# Patient Record
Sex: MALE | Race: WHITE | NOT HISPANIC OR LATINO | Employment: FULL TIME | URBAN - METROPOLITAN AREA
[De-identification: names, ages, dates, MRNs, and addresses within clinical notes are randomized per-mention and may not be internally consistent; named-entity substitution may affect disease eponyms.]

---

## 2022-06-28 ENCOUNTER — TELEPHONE (OUTPATIENT)
Dept: OPHTHALMOLOGY | Facility: CLINIC | Age: 52
End: 2022-06-28

## 2022-06-28 ENCOUNTER — TRANSFERRED RECORDS (OUTPATIENT)
Dept: HEALTH INFORMATION MANAGEMENT | Facility: CLINIC | Age: 52
End: 2022-06-28

## 2022-06-28 ENCOUNTER — OFFICE VISIT (OUTPATIENT)
Dept: OPHTHALMOLOGY | Facility: CLINIC | Age: 52
End: 2022-06-28
Attending: OPHTHALMOLOGY
Payer: COMMERCIAL

## 2022-06-28 DIAGNOSIS — H50.22 HYPERTROPIA OF LEFT EYE: Primary | ICD-10-CM

## 2022-06-28 DIAGNOSIS — H49.10 CONGENITAL PALSY OF FOURTH CRANIAL NERVE: ICD-10-CM

## 2022-06-28 PROCEDURE — G0463 HOSPITAL OUTPT CLINIC VISIT: HCPCS | Mod: 25

## 2022-06-28 PROCEDURE — 92060 SENSORIMOTOR EXAMINATION: CPT | Mod: 26 | Performed by: STUDENT IN AN ORGANIZED HEALTH CARE EDUCATION/TRAINING PROGRAM

## 2022-06-28 PROCEDURE — V2718 FRESNELL PRISM PRESS-ON LENS: HCPCS | Performed by: TECHNICIAN/TECHNOLOGIST

## 2022-06-28 PROCEDURE — 92002 INTRM OPH EXAM NEW PATIENT: CPT | Mod: GC | Performed by: STUDENT IN AN ORGANIZED HEALTH CARE EDUCATION/TRAINING PROGRAM

## 2022-06-28 PROCEDURE — 92060 SENSORIMOTOR EXAMINATION: CPT | Performed by: STUDENT IN AN ORGANIZED HEALTH CARE EDUCATION/TRAINING PROGRAM

## 2022-06-28 RX ORDER — LEVOTHYROXINE SODIUM 75 UG/1
75 TABLET ORAL DAILY
COMMUNITY
Start: 2022-05-06

## 2022-06-28 ASSESSMENT — CONF VISUAL FIELD
OS_NORMAL: 1
OD_NORMAL: 1

## 2022-06-28 ASSESSMENT — CUP TO DISC RATIO
OD_RATIO: 0.1
OS_RATIO: 0.1

## 2022-06-28 ASSESSMENT — TONOMETRY
OS_IOP_MMHG: 12
IOP_METHOD: TONOPEN
OD_IOP_MMHG: 13

## 2022-06-28 ASSESSMENT — VISUAL ACUITY
OD_CC: 20/20
OS_CC: 20/20
METHOD: SNELLEN - LINEAR

## 2022-06-28 ASSESSMENT — SLIT LAMP EXAM - LIDS
COMMENTS: NORMAL
COMMENTS: NORMAL

## 2022-06-28 ASSESSMENT — EXTERNAL EXAM - RIGHT EYE: OD_EXAM: NORMAL

## 2022-06-28 ASSESSMENT — EXTERNAL EXAM - LEFT EYE: OS_EXAM: NORMAL
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
## 2022-06-28 NOTE — PATIENT INSTRUCTIONS
Mr. Blackwell,     You have what appears to be a decompensated congenital 4th nerve palsy in the left eye. The Fresnel prism we put on the glasses should help you fuse images more readily.     Please call us if you develop headache, worsening vision, or pain. If you cannot tolerate the prism please call us as well.    Follow up in 1 month for a repeat check.    Thank you for allowing us to participate in your care at the [facility].

## 2022-06-28 NOTE — NURSING NOTE
Chief Complaints and History of Present Illnesses   Patient presents with     Diplopia Evaluation     Pt here for new onset of Binocular double vision x4 days.      Chief Complaint(s) and History of Present Illness(es)     Diplopia Evaluation     Associated symptoms: Negative for droopy eyelid, blurred vision, color vision changes, headaches and dizziness    Comments: Pt here for new onset of Binocular double vision x4 days.               Comments     Pt notes sinus pressure around eyes that started today. Pt said he stopped to have his BP checked and was 143/85- he typically runs 110/70. Pt denies tinnitus. Denies previous episodes. Pt closes one eye and double vision goes away- pt notes images separate diagonally/ vertical- comes back into focus when he tilts to the right- gets worse when he tilts left. No double vision when he is looking straight. Denies trauma- but did go to [facility] and was on spinning rides.   No drops.   NELSON OLIVARES, COA 10:38 AM June 28, 2022

## 2022-06-28 NOTE — TELEPHONE ENCOUNTER
Dr. Kamara referring to [facility] for new onset of Binocular Diplopia starting June     Noted more with downgaze to the left.    4 diopters vertical prism on exam today right eye    No pupil changes-- pt not dilated at exam today    No s/s of stroke    H/o thyroidectomy--    Reviewed with acute clinic and pt scheduled today at 1030 with Dr. Montoya    Pt aware of date/time/location/duration    Tejinder Umanzor RN 9:33 AM 06/28/22

## 2022-06-29 ENCOUNTER — TRANSFERRED RECORDS (OUTPATIENT)
Dept: HEALTH INFORMATION MANAGEMENT | Facility: CLINIC | Age: 52
End: 2022-06-29

## 2022-06-29 LAB
CHOLESTEROL (EXTERNAL): 222 MG/DL
CREATININE (EXTERNAL): 1.1 MG/DL (ref 0.7–1.3)
GLUCOSE (EXTERNAL): 97 MG/DL (ref 70–124)
HBA1C MFR BLD: 5.3 % (ref 4.3–5.6)
HDLC SERPL-MCNC: 55 MG/DL (ref 40–999)
LDL CHOLESTEROL (EXTERNAL): 153 MG/DL (ref 0–130)
POTASSIUM (EXTERNAL): 4.4 MMOL/L (ref 3.4–5.3)
TRIGLYCERIDES (EXTERNAL): 68 MG/DL (ref 20–150)
TSH SERPL-ACNC: 3.85 MIU/L (ref 0.4–4.5)

## 2022-07-12 ENCOUNTER — MEDICAL CORRESPONDENCE (OUTPATIENT)
Dept: HEALTH INFORMATION MANAGEMENT | Facility: CLINIC | Age: 52
End: 2022-07-12

## 2022-07-12 ENCOUNTER — TRANSFERRED RECORDS (OUTPATIENT)
Dept: HEALTH INFORMATION MANAGEMENT | Facility: CLINIC | Age: 52
End: 2022-07-12

## 2022-07-12 LAB — PHQ9 SCORE: 6

## 2022-07-13 ENCOUNTER — TRANSCRIBE ORDERS (OUTPATIENT)
Dept: OTHER | Age: 52
End: 2022-07-13

## 2022-07-13 DIAGNOSIS — Z12.11 SCREENING FOR COLON CANCER: Primary | ICD-10-CM

## 2022-07-15 ENCOUNTER — TRANSCRIBE ORDERS (OUTPATIENT)
Dept: OTHER | Age: 52
End: 2022-07-15

## 2022-07-15 DIAGNOSIS — E78.00 PURE HYPERCHOLESTEROLEMIA: Primary | ICD-10-CM

## 2022-08-10 ENCOUNTER — OFFICE VISIT (OUTPATIENT)
Dept: OPHTHALMOLOGY | Facility: CLINIC | Age: 52
End: 2022-08-10
Attending: OPHTHALMOLOGY
Payer: COMMERCIAL

## 2022-08-10 DIAGNOSIS — H50.22 HYPERTROPIA OF LEFT EYE: Primary | ICD-10-CM

## 2022-08-10 DIAGNOSIS — H49.10 CONGENITAL PALSY OF FOURTH CRANIAL NERVE: ICD-10-CM

## 2022-08-10 PROCEDURE — G0463 HOSPITAL OUTPT CLINIC VISIT: HCPCS

## 2022-08-10 PROCEDURE — 92060 SENSORIMOTOR EXAMINATION: CPT | Performed by: OPHTHALMOLOGY

## 2022-08-10 PROCEDURE — 99213 OFFICE O/P EST LOW 20 MIN: CPT | Performed by: OPHTHALMOLOGY

## 2022-08-10 PROCEDURE — 92060 SENSORIMOTOR EXAMINATION: CPT | Mod: 26 | Performed by: OPHTHALMOLOGY

## 2022-08-10 ASSESSMENT — SLIT LAMP EXAM - LIDS
COMMENTS: NORMAL
COMMENTS: NORMAL

## 2022-08-10 ASSESSMENT — VISUAL ACUITY
CORRECTION_TYPE: GLASSES
OS_CC+: -
OD_CC: 20/20
METHOD: SNELLEN - LINEAR
OS_CC: 20/20

## 2022-08-10 ASSESSMENT — CONF VISUAL FIELD
OD_NORMAL: 1
METHOD: COUNTING FINGERS
OS_NORMAL: 1

## 2022-08-10 ASSESSMENT — EXTERNAL EXAM - LEFT EYE: OS_EXAM: NORMAL

## 2022-08-10 ASSESSMENT — EXTERNAL EXAM - RIGHT EYE: OD_EXAM: NORMAL
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
## 2022-08-10 NOTE — NURSING NOTE
Chief Complaints and History of Present Illnesses   Patient presents with     Diplopia Follow Up     Chief Complaint(s) and History of Present Illness(es)     Diplopia Follow Up     Laterality: left eye    Course: gradually improving    Pain scale: 0/10              Comments     6 week follow up for diplopia / hypertropia of LE.  'Definitely better with the fresnel', 'even without the the glasses'  Pt states no use of art tears.  Pt states had Covid since last visit, 'started to test negative two weeks ago.' States 'mild symptoms which lasted about two days.'  Ignacia Humphrey, COT COT 8:43 AM 08/10/2022

## 2022-10-04 ENCOUNTER — TRANSFERRED RECORDS (OUTPATIENT)
Dept: HEALTH INFORMATION MANAGEMENT | Facility: CLINIC | Age: 52
End: 2022-10-04

## 2022-10-04 ENCOUNTER — MEDICAL CORRESPONDENCE (OUTPATIENT)
Dept: HEALTH INFORMATION MANAGEMENT | Facility: CLINIC | Age: 52
End: 2022-10-04

## 2022-10-05 ENCOUNTER — TRANSCRIBE ORDERS (OUTPATIENT)
Dept: OTHER | Age: 52
End: 2022-10-05

## 2022-10-05 DIAGNOSIS — R10.33 PERIUMBILICAL ABDOMINAL PAIN: Primary | ICD-10-CM

## 2022-10-05 DIAGNOSIS — Z98.890 HISTORY OF UMBILICAL HERNIA REPAIR: ICD-10-CM

## 2022-10-05 DIAGNOSIS — Z87.19 HISTORY OF UMBILICAL HERNIA REPAIR: ICD-10-CM

## 2022-10-05 NOTE — TELEPHONE ENCOUNTER
REFERRAL INFORMATION:    Referring Provider:      Referring Clinic:     Reason for Visit/Diagnosis: Umbilical hernia        FUTURE VISIT INFORMATION:    Appointment Date: 10/7/2022    Appointment Time: 7:15 AM      NOTES RECORD STATUS  DETAILS   OFFICE NOTE from Referring Provider N/A    OFFICE NOTE from Other Specialists N/A    HOSPITAL DISCHARGE SUMMARY/ ED VISITS  N/A    OPERATIVE REPORT Internal Open Umbilical Hernia Repair: 4/27/12 (Dr. Michel)      ENDOSCOPY (EGD)  N/A    PERTINENT LABS Internal    PATHOLOGY REPORTS (RELATED) N/A    IMAGING (CT, MRI, US, XR)  N/A

## 2022-10-07 ENCOUNTER — PRE VISIT (OUTPATIENT)
Dept: SURGERY | Facility: CLINIC | Age: 52
End: 2022-10-07

## 2022-10-07 ENCOUNTER — OFFICE VISIT (OUTPATIENT)
Dept: SURGERY | Facility: CLINIC | Age: 52
End: 2022-10-07
Payer: COMMERCIAL

## 2022-10-07 VITALS
WEIGHT: 191.8 LBS | BODY MASS INDEX: 26.85 KG/M2 | HEIGHT: 71 IN | SYSTOLIC BLOOD PRESSURE: 126 MMHG | DIASTOLIC BLOOD PRESSURE: 84 MMHG | HEART RATE: 67 BPM | OXYGEN SATURATION: 98 %

## 2022-10-07 DIAGNOSIS — R10.33 PERIUMBILICAL ABDOMINAL PAIN: Primary | ICD-10-CM

## 2022-10-07 PROCEDURE — 99202 OFFICE O/P NEW SF 15 MIN: CPT | Performed by: SURGERY

## 2022-10-07 ASSESSMENT — PAIN SCALES - GENERAL: PAINLEVEL: NO PAIN (0)

## 2022-10-07 NOTE — PATIENT INSTRUCTIONS
You met with Dr. Miguel A Gunn.      Today's visit instructions:    Return to the Surgery Clinic on an as needed basis.      If you have questions please contact Adriana RN or Brigette RN during regular clinic hours, Monday through Friday 7:30 AM - 4:00 PM, or you can contact us via [employer] at anytime.       If you have urgent needs after-hours, weekends, or holidays please call the hospital at [phone] and ask to speak with our on-call General Surgery Team.    Appointment [phone]  Nurse Advice (Adriana or Brigette): [phone]   Surgery Scheduler (Christian or Christelle): [phone]  Fax: [phone]

## 2022-10-07 NOTE — PROGRESS NOTES
"I saw Tyree Blackwell today in clinic for evaluation of pain along his umbilicus- similar to his symptoms when he had a primary umbilical hernia 10 years ago.    Last week he noted sudden onset of a discomfort/pulling sensation along his umbilicus, radiating down to his pubis.  Just came on suddenly- was not lifting/exercising.  No recent chronic cough, no constipation or straining to void.  No vomiting or abdominal distension.  He has not felt a bulge.  Went to his PCP, no hernia felt there either.    /84 (BP Location: Left arm, Patient Position: Sitting, Cuff Size: Adult Regular)   Pulse 67   Ht 1.803 m (5' 11\")   Wt 87 kg (191 lb 12.8 oz)   SpO2 98%   BMI 26.75 kg/m      A+Ox3, NAD  RRR  No resp distress or wheezing  Abd is soft, nondistended, nontender to palpation. No palpable bulge or hernia defect appreciated on standing or laying.    No imaging.    A/P:  MSK pain of unclear etiology.  Could be a small recurrence of the prior hernia, but nothing evident on exam.  We talked about monitoring and treating this as if it is a muscle strain (NSAIDs, ice/heat application) vs obtaining an US to evaluate.  He would like to observe for now.    -follow-up prn  -contact card given  "

## 2022-10-13 DIAGNOSIS — Z13.6 CARDIOVASCULAR SCREENING; LDL GOAL LESS THAN 100: Primary | ICD-10-CM

## 2022-10-17 ENCOUNTER — TELEPHONE (OUTPATIENT)
Dept: OPHTHALMOLOGY | Facility: CLINIC | Age: 52
End: 2022-10-17

## 2022-10-17 NOTE — TELEPHONE ENCOUNTER
[facility] Call Center    Phone Message    May a detailed message be left on voicemail: yes     Reason for Call: Other: Patient states he needs to replace the fresnel 2PD base and needs to know how.  Please call.     Action Taken: Message routed to:  [facility] ([facility] Ophthalmology    Travel Screening: Not Applicable
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
## 2022-10-23 ENCOUNTER — HEALTH MAINTENANCE LETTER (OUTPATIENT)
Age: 52
End: 2022-10-23

## 2022-10-27 ENCOUNTER — OFFICE VISIT (OUTPATIENT)
Dept: CARDIOLOGY | Facility: CLINIC | Age: 52
End: 2022-10-27
Payer: COMMERCIAL

## 2022-10-27 ENCOUNTER — LAB (OUTPATIENT)
Dept: LAB | Facility: CLINIC | Age: 52
End: 2022-10-27
Payer: COMMERCIAL

## 2022-10-27 VITALS
WEIGHT: 187.3 LBS | SYSTOLIC BLOOD PRESSURE: 123 MMHG | DIASTOLIC BLOOD PRESSURE: 80 MMHG | BODY MASS INDEX: 26.22 KG/M2 | HEART RATE: 62 BPM | HEIGHT: 71 IN

## 2022-10-27 DIAGNOSIS — Z13.6 CARDIOVASCULAR SCREENING; LDL GOAL LESS THAN 100: ICD-10-CM

## 2022-10-27 DIAGNOSIS — L40.9 PSORIASIS: Primary | ICD-10-CM

## 2022-10-27 DIAGNOSIS — E78.00 PURE HYPERCHOLESTEROLEMIA: ICD-10-CM

## 2022-10-27 LAB
CHOLEST SERPL-MCNC: 214 MG/DL
CREAT UR-MCNC: 256 MG/DL
CRP SERPL HS-MCNC: 0.42 MG/L
FASTING STATUS PATIENT QL REPORTED: YES
GLUCOSE SERPL-MCNC: 105 MG/DL (ref 70–99)
HDLC SERPL-MCNC: 47 MG/DL
LDLC SERPL CALC-MCNC: 153 MG/DL
MICROALBUMIN UR-MCNC: 12.9 MG/L
MICROALBUMIN/CREAT UR: 5.04 MG/G CR (ref 0–17)
NONHDLC SERPL-MCNC: 167 MG/DL
NT-PROBNP SERPL-MCNC: 19 PG/ML (ref 0–900)
TRIGL SERPL-MCNC: 69 MG/DL

## 2022-10-27 PROCEDURE — 82947 ASSAY GLUCOSE BLOOD QUANT: CPT | Performed by: PATHOLOGY

## 2022-10-27 PROCEDURE — 83880 ASSAY OF NATRIURETIC PEPTIDE: CPT | Performed by: PATHOLOGY

## 2022-10-27 PROCEDURE — 82043 UR ALBUMIN QUANTITATIVE: CPT | Mod: 90 | Performed by: PATHOLOGY

## 2022-10-27 PROCEDURE — 36415 COLL VENOUS BLD VENIPUNCTURE: CPT | Performed by: PATHOLOGY

## 2022-10-27 PROCEDURE — 86141 C-REACTIVE PROTEIN HS: CPT | Mod: 90 | Performed by: PATHOLOGY

## 2022-10-27 PROCEDURE — 93000 ELECTROCARDIOGRAM COMPLETE: CPT | Performed by: INTERNAL MEDICINE

## 2022-10-27 PROCEDURE — 80061 LIPID PANEL: CPT | Mod: 90 | Performed by: PATHOLOGY

## 2022-10-27 PROCEDURE — 99000 SPECIMEN HANDLING OFFICE-LAB: CPT | Performed by: PATHOLOGY

## 2022-10-27 PROCEDURE — 93922 UPR/L XTREMITY ART 2 LEVELS: CPT | Performed by: NURSE PRACTITIONER

## 2022-10-27 PROCEDURE — 99215 OFFICE O/P EST HI 40 MIN: CPT | Mod: 25 | Performed by: NURSE PRACTITIONER

## 2022-10-27 NOTE — PROGRESS NOTES
[facility] for Cardiovascular Disease Prevention - Exam Note    Active Problems   Patient Active Problem List    Diagnosis Date Noted     Psoriasis 10/27/2022     Priority: Medium     Congenital palsy of fourth cranial nerve 08/10/2022     Priority: Medium     Hypothyroidism 04/24/2012     Priority: Medium       Reason For Visit   Patient here for [facility] early detection of atherosclerosis and CVD exam.    Pain Evaluation  Current history of pain associated with this visit is: denied    Cardiac risk factors:  - age   - smoking    - elevated BMI   + Family history CVD   - Diet     -Hypertension    HPI   Tyree Blackwell is a 52 year old year old male with a history of hypothyroidism and psoriasis. Both of his grandmother's had heart disease later in life. He does not check his BP at home or take any cardiac medications.   His primary care provider is Dr. Aisha Lopez at [facility].  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   He rarely gets up to urinate in the middle of the night and has a rare headache.  Today in clinic he denies chest pain at rest, with activity, while sleeping, SOB at rest, with activity or while sleeping, palpitations, lightheadedness, lower leg edema, calf cramps, indigestion, or issues with his memory.     Nutrition assessment per patient report:   Foods with fat/cholesterol (fried foods, fatty meats, junk food):  0 servings   Fruits and vegetables (  cup cooked, 1 cup raw):  2-4 servings of vegetables/day, 1-3 servings of fruit/day  Caffeine (1 cup coffee, soda, etc):  1 soda, 12 ounces with sugar  Alcohol servings (12 oz. beer, 4 oz. wine, 1  oz. in mixed drink):  0 servings  Special dietary habits:  none  Typical breakfast:  Piece of bread                 Lunch: skip or left overs                 Dinner: protein, vegetables, chicken, fish, pasta, rice                 Snacks: chocolate                 Drinks:  water    Activity  Patient  bike to and from work, 9 miles round trip    Sleep pattern:  Good, up with his kids often    Laboratory Results Review  We discussed laboratory results today including lipids targets and how foods influence cholesterol.    Weight  His perceived healthy weight is 175 pounds.  A normal BMI of 25 is equal to 176 pounds.  The current BMI of 26.49 is normal weight range.     PMH   Past Medical History:   Diagnosis Date     Psoriasis      Thyroid disease        PSH  Past Surgical History:   Procedure Laterality Date     HERNIORRHAPHY UMBILICAL  4/27/2012    Procedure:HERNIORRHAPHY UMBILICAL; Open Umbilical Hernia Repair; Surgeon:JUNI CURRIE; Location:[facility] OR     tonsillectomy[       wisdom teeth extraction[         Current Meds   Current Outpatient Medications   Medication Sig Dispense Refill     levothyroxine (SYNTHROID/LEVOTHROID) 75 MCG tablet Take 75 mcg by mouth daily       loratadine 10 MG capsule Take 10 mg by mouth daily.         Allergies    No Known Allergies    Family Hx   Family History   Problem Relation Age of Onset     Liver Disease Father         CARMICHAEL s/p liver transplant 2012     Coronary Artery Disease Maternal Grandfather      Hypertension Maternal Grandfather      Cerebrovascular Disease Maternal Grandfather 40     Thyroid Disease Paternal Grandmother      Coronary Artery Disease Paternal Grandmother 75        s/p CABG     Prostate Cancer Paternal Grandfather        Social History     He is  2 children (not biological).     Tobacco History  History   Smoking Status     Never   Smokeless Tobacco     Never       ROS  CONSTITUTIONAL:  No fever, chills, or sweats. No weight gain/loss.   EENT:  No visual disturbance, ear ache, epistaxis, sore throat  ALLERGIES/IMMUNOLOGIC:  Negative  RESPIRATORY:  No cough, hemoptysis  CARDIOVASCULAR:  As per HPI  GI:  No nausea, vomiting, hematemesis, melena  :  No urinary frequency, dysuria, or hematuria  INTEGUMENT:  Negative  PSYCHIATRIC:   "Negative  NEURO:  Negative  ENDOCRINE:  Negative  MUSCULOSKELETAL:  Negative     Vital Signs   /80 (BP Location: Right arm, Patient Position: Sitting, Cuff Size: Adult Regular)   Pulse 62   Ht 1.791 m (5' 10.5\")   Wt 85 kg (187 lb 4.8 oz)   BMI 26.49 kg/m        Waist: 37 inches  Hip: 40 inches    Physical Exam   In general, the patient is a pleasant male in no apparent distress   HEENT: NC/AT, PERRLA, EOMI, sclerae white, not injected. Nares clear, pharynx without erythema or exudate, dentition intact    Neck: No adenopathy, no thyromegaly, carotids +4/4 bilaterally without bruits,  no jugular venous distension   Lungs: Breath sounds clear bilaterally, without crackles, ronchi, or wheezes  Cor: RRR, S1S2 without murmur, rub, click, or gallop, the PMI is in the 5th ICS in the midclavicular line  Abdomen: Soft, nontender, nondistended, BS+ in all 4 quadrants, without hepatomegaly, no aorta or renal artery bruits  Extremities: No clubbing, cyanosis, or edema. DP and PT pulses +2/4 bilaterally    The 10-year ASCVD risk score ([place] PAZ Jr., et al., 2013) is: 5.5%  Values used to calculate the score:   Age: 52 year old   Sex: male   Is Non- : No   Diabetic: No   Tobacco smoker: No   Systolic Blood Press: 124 mmHg   Is BP treated: No   HDL Cholesterol:  47 mg/dL   Total Cholesterol: 214 mg/dL    Recent Labs  Lab Results   Component Value Date     (H) 10/27/2022     (H) 12/23/2015      Lab Results   Component Value Date    NTBNP 19 10/27/2022     No results found for: NTBNPI   No results found for: UCRR   No results found for: MICROL   No results found for: MICROALBUMIN   Lab Results   Component Value Date    CRP <5.0 11/07/2012      No results found for: CHOL   Lab Results   Component Value Date    TRIG 68 06/29/2022      No results found for: HDL   No results found for: LDL   No results found for: VLDL   No results found for: CHOLHDLRATIO  No results found for: NHDL "     Assessment:    Cardiovascular:  Asymptomatic, he is not complaining of chest pain, plaque noted in carotid artery, recommend scheduling a CAC to further quantify his cardiac risk    Blood Pressure:  He does not take BP medication, BP, 123-124/80-82    Lipids:  He does not take a statin medication, recommend starting 10 mg of atorvastatin and recheck FLP in 10-12 weeks after starting atrovastatin  !LIPID/HEPATIC Latest Ref Rng & Units 11/7/2012 6/29/2022   CHOL <200 mg/dL     TRIGLYCERIDES <150 mg/dL     TRIGLYCERIDES (EXTERNAL) 20 - 150 mg/dL  68   HDL >=40 mg/dL     HDL CHOLESTEROL (EXTERNAL) 40 - 999 mg/dL  55   LDL <=100 mg/dL     NHDL <130 mg/dL     AST 0 - 45 U/L 36    ALT 0 - 70 U/L 29    CRP  <5.0      !LIPID/HEPATIC Latest Ref Rng & Units 10/27/2022   CHOL <200 mg/dL 214 (H)   TRIGLYCERIDES <150 mg/dL 69   TRIGLYCERIDES (EXTERNAL) 20 - 150 mg/dL    HDL >=40 mg/dL 47   HDL CHOLESTEROL (EXTERNAL) 40 - 999 mg/dL    LDL <=100 mg/dL 153 (H)   NHDL <130 mg/dL 167 (H)   AST 0 - 45 U/L    ALT 0 - 70 U/L    CRP  0.42       Glucose: 105, continue to manage blood glucose with PCP    Sleep pattern:  Sleep hygiene reviewed during clinic visit, handout given to patient    Weight Management: BMI 26.48    Return to Clinic: 5 years    Health Habit Summary:  Nutrition: Heart Healthy Eating:  most of the time   Exercise:  regularly active  Weight:  normal weight range  Tobacco Use:  never used    This case was presented to Dr. Padgett and Dr. Lrary Vaughan during our weekly conference.     Time spent for patient visit was 60 minutes with more than half the time spent on counseling and coordination of care.    Yancy Dwyer, APRN CNP       CC  Patient Care Team:  Amos Schroeder MD as PCP - General  Mckenzie Louie MD as Assigned Surgical Provider  Miguel A Gunn MD as MD (Critical Care)  AMOS SCHROEDER

## 2022-10-27 NOTE — LETTER
10/27/2022      RE: Tyree Blackwell  [address]  [place] [address]       Dear Colleague,    Thank you for the opportunity to participate in the care of your patient, Tyree Blackwell, at the [facility] [place] [facility]. Please see a copy of my visit note below.      [facility] for Cardiovascular Disease Prevention - Exam Note    Active Problems   Patient Active Problem List    Diagnosis Date Noted     Psoriasis 10/27/2022     Priority: Medium     Congenital palsy of fourth cranial nerve 08/10/2022     Priority: Medium     Hypothyroidism 04/24/2012     Priority: Medium       Reason For Visit   Patient here for [facility] early detection of atherosclerosis and CVD exam.    Pain Evaluation  Current history of pain associated with this visit is: denied    Cardiac risk factors:  - age   - smoking    - elevated BMI   + Family history CVD   - Diet     -Hypertension    HPI   Tyree Blackwell is a 52 year old year old male with a history of hypothyroidism and psoriasis. Both of his grandmother's had heart disease later in life. He does not check his BP at home or take any cardiac medications.   His primary care provider is Dr. Aisha Lopez at [facility].  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   He rarely gets up to urinate in the middle of the night and has a rare headache.  Today in clinic he denies chest pain at rest, with activity, while sleeping, SOB at rest, with activity or while sleeping, palpitations, lightheadedness, lower leg edema, calf cramps, indigestion, or issues with his memory.     Nutrition assessment per patient report:   Foods with fat/cholesterol (fried foods, fatty meats, junk food):  0 servings   Fruits and vegetables (  cup cooked, 1 cup raw):  2-4 servings of vegetables/day, 1-3 servings of fruit/day  Caffeine (1 cup coffee, soda,  etc):  1 soda, 12 ounces with sugar  Alcohol servings (12 oz. beer, 4 oz. wine, 1  oz. in mixed drink):  0 servings  Special dietary habits:  none  Typical breakfast:  Piece of bread                 Lunch: skip or left overs                 Dinner: protein, vegetables, chicken, fish, pasta, rice                 Snacks: chocolate                 Drinks:  water    Activity  Patient bike to and from work, 9 miles round trip    Sleep pattern:  Good, up with his kids often    Laboratory Results Review  We discussed laboratory results today including lipids targets and how foods influence cholesterol.    Weight  His perceived healthy weight is 175 pounds.  A normal BMI of 25 is equal to 176 pounds.  The current BMI of 26.49 is normal weight range.     PMH   Past Medical History:   Diagnosis Date     Psoriasis      Thyroid disease        PSH  Past Surgical History:   Procedure Laterality Date     HERNIORRHAPHY UMBILICAL  4/27/2012    Procedure:HERNIORRHAPHY UMBILICAL; Open Umbilical Hernia Repair; Surgeon:JUNI CURRIE; Location:[facility] OR     tonsillectomy[       wisdom teeth extraction[         Current Meds   Current Outpatient Medications   Medication Sig Dispense Refill     levothyroxine (SYNTHROID/LEVOTHROID) 75 MCG tablet Take 75 mcg by mouth daily       loratadine 10 MG capsule Take 10 mg by mouth daily.         Allergies    No Known Allergies    Family Hx   Family History   Problem Relation Age of Onset     Liver Disease Father         CARMICHAEL s/p liver transplant 2012     Coronary Artery Disease Maternal Grandfather      Hypertension Maternal Grandfather      Cerebrovascular Disease Maternal Grandfather 40     Thyroid Disease Paternal Grandmother      Coronary Artery Disease Paternal Grandmother 75        s/p CABG     Prostate Cancer Paternal Grandfather        Social History     He is  2 children (not biological).     Tobacco History  History   Smoking Status     Never   Smokeless Tobacco     Never  "      ROS  CONSTITUTIONAL:  No fever, chills, or sweats. No weight gain/loss.   EENT:  No visual disturbance, ear ache, epistaxis, sore throat  ALLERGIES/IMMUNOLOGIC:  Negative  RESPIRATORY:  No cough, hemoptysis  CARDIOVASCULAR:  As per HPI  GI:  No nausea, vomiting, hematemesis, melena  :  No urinary frequency, dysuria, or hematuria  INTEGUMENT:  Negative  PSYCHIATRIC:  Negative  NEURO:  Negative  ENDOCRINE:  Negative  MUSCULOSKELETAL:  Negative     Vital Signs   /80 (BP Location: Right arm, Patient Position: Sitting, Cuff Size: Adult Regular)   Pulse 62   Ht 1.791 m (5' 10.5\")   Wt 85 kg (187 lb 4.8 oz)   BMI 26.49 kg/m        Waist: 37 inches  Hip: 40 inches    Physical Exam   In general, the patient is a pleasant male in no apparent distress   HEENT: NC/AT, PERRLA, EOMI, sclerae white, not injected. Nares clear, pharynx without erythema or exudate, dentition intact    Neck: No adenopathy, no thyromegaly, carotids +4/4 bilaterally without bruits,  no jugular venous distension   Lungs: Breath sounds clear bilaterally, without crackles, ronchi, or wheezes  Cor: RRR, S1S2 without murmur, rub, click, or gallop, the PMI is in the 5th ICS in the midclavicular line  Abdomen: Soft, nontender, nondistended, BS+ in all 4 quadrants, without hepatomegaly, no aorta or renal artery bruits  Extremities: No clubbing, cyanosis, or edema. DP and PT pulses +2/4 bilaterally    The 10-year ASCVD risk score ([place] PAZ Jr., et al., 2013) is: 5.5%  Values used to calculate the score:   Age: 52 year old   Sex: male   Is Non- : No   Diabetic: No   Tobacco smoker: No   Systolic Blood Press: 124 mmHg   Is BP treated: No   HDL Cholesterol:  47 mg/dL   Total Cholesterol: 214 mg/dL    Recent Labs  Lab Results   Component Value Date     (H) 10/27/2022     (H) 12/23/2015      Lab Results   Component Value Date    NTBNP 19 10/27/2022     No results found for: NTBNPI   No results found for: UCRR "   No results found for: MICROL   No results found for: MICROALBUMIN   Lab Results   Component Value Date    CRP <5.0 11/07/2012      No results found for: CHOL   Lab Results   Component Value Date    TRIG 68 06/29/2022      No results found for: HDL   No results found for: LDL   No results found for: VLDL   No results found for: CHOLHDLRATIO  No results found for: NHDL     Assessment:    Cardiovascular:  Asymptomatic, he is not complaining of chest pain, plaque noted in carotid artery, recommend scheduling a CAC to further quantify his cardiac risk    Blood Pressure:  He does not take BP medication, BP, 123-124/80-82    Lipids:  He does not take a statin medication, recommend starting 10 mg of atorvastatin and recheck FLP in 10-12 weeks after starting atrovastatin  !LIPID/HEPATIC Latest Ref Rng & Units 11/7/2012 6/29/2022   CHOL <200 mg/dL     TRIGLYCERIDES <150 mg/dL     TRIGLYCERIDES (EXTERNAL) 20 - 150 mg/dL  68   HDL >=40 mg/dL     HDL CHOLESTEROL (EXTERNAL) 40 - 999 mg/dL  55   LDL <=100 mg/dL     NHDL <130 mg/dL     AST 0 - 45 U/L 36    ALT 0 - 70 U/L 29    CRP  <5.0      !LIPID/HEPATIC Latest Ref Rng & Units 10/27/2022   CHOL <200 mg/dL 214 (H)   TRIGLYCERIDES <150 mg/dL 69   TRIGLYCERIDES (EXTERNAL) 20 - 150 mg/dL    HDL >=40 mg/dL 47   HDL CHOLESTEROL (EXTERNAL) 40 - 999 mg/dL    LDL <=100 mg/dL 153 (H)   NHDL <130 mg/dL 167 (H)   AST 0 - 45 U/L    ALT 0 - 70 U/L    CRP  0.42       Glucose: 105, continue to manage blood glucose with PCP    Sleep pattern:  Sleep hygiene reviewed during clinic visit, handout given to patient    Weight Management: BMI 26.48    Return to Clinic: 5 years    Health Habit Summary:  Nutrition: Heart Healthy Eating:  most of the time   Exercise:  regularly active  Weight:  normal weight range  Tobacco Use:  never used    This case was presented to Dr. Padgett and Dr. Larry Vaughan during our weekly conference.     Time spent for patient visit was 60 minutes with more than half the time  spent on counseling and coordination of care.    BRIAN Ogden CNP       CC  Patient Care Team:  Amos Schroeder MD as PCP - General  [facility] Hospital[facility]ck[facility] eller [facility] as Assigned Surgical Provider  Miguel A Gunn MD as MD (Critical Care)  AMOS SCHROEDER Test Results    WALKING BLOOD PRESSURE RESPONSE (3 minute, 5 MET level walk)   Pre BP: 110/72 mmHg  3 min BP: 132/56 mmHg  1 min post BP: 118/70 mmHg    Pre HR: 72 bpm  3 min HR: 94 bpm  1 min post HR: 68 bpm     Test results: walking blood pressure response to 3 minutes activity is in normal range.     ABDOMINAL AORTA ULTRASOUND (< 2.5 normal, borderline 2.5-2.9, abnormal > 3)   SupraIliac 1.9 cm    SupraRenal 2.1 cm    InfraRenal Proximal 2.1 cm    InfraRenal Distal 2.0 cm      Abdominal Aorta Assessment:  normal    LEFT VENTRICULAR ULTRASOUND MEASUREMENTS (adjusted for BSA)  LVIDD 47.7 mm   Septa 10.8 mm   Posterior 8.1 mm     Left Ventricular US Assessment:  normal    Carotid Artery IMT measurements report and plaques in the small area examined:   Left IMT 0.648 mm  Plaques small heterogeneous plaque in left bulb area size 2.3 mm.    Right IMT 0.649 mm  Plaques none     Test results: carotid artery wall thickening is in abnormal range due to plaque formations present.     ECG (see tracing):  normal sinus rhythm;  rate: 62 bpm    Arterial Elasticity per age and gender (see printout):   C1 22.7 mL/mmHg x 10  normal   C2 7.8 mL/mmHg x 100 normal   Supine blood pressure: 141/83 mmHg     Test results: arterial elasticity of the large and small size arteries is in normal range after adjusting for age and gender.     Gracie Baig      Please do not hesitate to contact me if you have any questions/concerns.     Sincerely,     BRIAN Ogden CNP

## 2022-10-28 NOTE — PROGRESS NOTES
[facility] Test Results    WALKING BLOOD PRESSURE RESPONSE (3 minute, 5 MET level walk)   Pre BP: 110/72 mmHg  3 min BP: 132/56 mmHg  1 min post BP: 118/70 mmHg    Pre HR: 72 bpm  3 min HR: 94 bpm  1 min post HR: 68 bpm     Test results: walking blood pressure response to 3 minutes activity is in normal range.     ABDOMINAL AORTA ULTRASOUND (< 2.5 normal, borderline 2.5-2.9, abnormal > 3)   SupraIliac 1.9 cm    SupraRenal 2.1 cm    InfraRenal Proximal 2.1 cm    InfraRenal Distal 2.0 cm      Abdominal Aorta Assessment:  normal    LEFT VENTRICULAR ULTRASOUND MEASUREMENTS (adjusted for BSA)  LVIDD 47.7 mm   Septa 10.8 mm   Posterior 8.1 mm     Left Ventricular US Assessment:  normal    Carotid Artery IMT measurements report and plaques in the small area examined:   Left IMT 0.648 mm  Plaques small heterogeneous plaque in left bulb area size 2.3 mm.    Right IMT 0.649 mm  Plaques none     Test results: carotid artery wall thickening is in abnormal range due to plaque formations present.     ECG (see tracing):  normal sinus rhythm;  rate: 62 bpm    Arterial Elasticity per age and gender (see printout):   C1 22.7 mL/mmHg x 10  normal   C2 7.8 mL/mmHg x 100 normal   Supine blood pressure: 141/83 mmHg     Test results: arterial elasticity of the large and small size arteries is in normal range after adjusting for age and gender.     Gracie Baig

## 2022-11-03 RX ORDER — ATORVASTATIN CALCIUM 10 MG/1
10 TABLET, FILM COATED ORAL DAILY
Qty: 90 TABLET | Refills: 3 | Status: SHIPPED | OUTPATIENT
Start: 2022-11-03

## 2022-11-03 NOTE — PATIENT INSTRUCTIONS
[facility] for Cardiovascular Disease Prevention    Thank you for choosing to participate in the prevention screening offered at the [facility]. Prevention screening is important part of health care.  Atherosclerosis may result in heart attacks, strokes, heart failure, peripheral artery disease and shortened life expectancy. The risk for premature development of this disease is both genetic (family history) and environmental (diet, exercise, lifestyle, etc.).  Goals of your cardiovascular prevention screening include detecting the earliest signs of blood vessel or heart abnormalities, and identifying markers for risk that can be treated if identified early. Recommendations are included to improve health habits. In some cases medication may be recommended to help slow progression of disease. Our goal is to assist you in prevention of a heart attack, stroke and other cardiovascular diseases that are the major cause of illness and mortality in our society.    Your total cholesterol and LDL (bad cholesterol) results are not in the  optimal  range. Your other cholesterol numbers are at goal.  Target levels depend on the presence or absence of cardiovascular disease.   Your target level of total cholesterol are less than 200 mg/dL, LDL less than 100 mg/dL, HDL 40 ml/dL or higher, and a triglyceride level of less than 150 mg/dL.  We recommend continuation of ongoing health habit modification (heart healthy nutrition, maintaining your weight within a normal weight range and an exercise routine) to maintain these levels.  An ideal weight range is a body mass index of 25 or less.  We recommend starting 10 mg of atorvastatin.  An order has been sent to your local pharmacy.  An order has been placed in your electronic medical record to recheck your fasting lipid profile in 10-12 week after starting this medication.   Please call [phone] to schedule a lab appointment.      2. Your blood glucose (blood sugar)  is higher than normal and may indicate insulin resistance (consistent glucose levels of 100-109 mg/dL).  Your body makes enough insulin, but the cells do not utilize it well.  Glucose levels vary from hour to hour depending on your weight and eating habits.  Maintaining a healthy weight is often effective in maintaining a normal blood glucose level and delaying or preventing the development of diabetes over time.  We recommend re-checking your blood glucose level and a baseline HA1C with your primary care provider within 6-12 months.    3. Your Carotid IMT (see test description for explanation) is thickened and increases cardiovascular risk.   In the small area that was visualized, small plaque (fatty deposits) were identified. Good blood flow continues with these small plaques but is it important to prevent further growth over time. Statin cholesterol medications and healthy living habits are important to help prevent growth of plaques over time.  We would like to repeat this measurement with your next exam to see if it is changing over time.    4. Your diet is heart healthy and well balanced.  We recommend increasing your intake of vegetables to 4-5 servings/day and increasing your fruit intake to 3-4 servings/day and incorporating healthy fats of the the Mediterranean diet into your diet. Eating salmon and using extra virgin olive oil are good examples. Nutrients found in fruits, vegetables and whole grains have been shown to be beneficial for the long-term health of your heart and blood vessels.     5. Great job with your regular exercise regimen!  Regular exercise can help lower cholesterol, blood pressure, blood glucose and improve the health of your heart and blood vessels. The American Heart Association recommends 150 minutes of exercise per week, including strength (resistance training).  Always exercise within your comfort zone (no chest pain, able to talk comfortably).    6. A coronary artery calcium scan  is recommended to further assess your cardiovascular risk. This test may or may not be covered by insurance and costs approximately $150 ($160 with tax) and can be obtained at a variety of cardiovascular centers. If lung nodules are observed with this test follow up scans may be needed for further evaluation. An order for this diagnostic test has been placed in your electronic medical record.  Call [facility] at [phone] to schedule this non-invasive diagnostic test.     7. We suggest that you consider incorporating 4-7-8 relaxation breathing, mindfulness stress reduction, meditation, yoga,and/or aromatherapy into your healthy lifestyle routine. All of these integrative therapies have been shown to be useful in reducing stress and promoting health. The website for the [facility] at the [facility] is [email or web address]. Taking Charge of Your Health and Wellbeing is a wonderful assessment tool to learn more about your wellbeing.    8.  Return to clinic in 5 years.     Thank you for choosing to participate in the prevention screening at [facility] CV Prevention clinic.  Cardiovascular prevention screening is important. Atherosclerosis may result in heart attacks, strokes, heart failure, peripheral artery disease.    Yancy Dwyer, DNP, APRN, FNP-C

## 2023-11-11 ENCOUNTER — HEALTH MAINTENANCE LETTER (OUTPATIENT)
Age: 53
End: 2023-11-11

## 2024-12-28 ENCOUNTER — HEALTH MAINTENANCE LETTER (OUTPATIENT)
Age: 54
End: 2024-12-28

## 2025-02-12 ENCOUNTER — NURSE TRIAGE (OUTPATIENT)
Dept: NURSING | Facility: CLINIC | Age: 55
End: 2025-02-12
Payer: COMMERCIAL

## 2025-02-12 ENCOUNTER — HOSPITAL ENCOUNTER (EMERGENCY)
Facility: CLINIC | Age: 55
Discharge: HOME | End: 2025-02-12
Attending: EMERGENCY MEDICINE | Admitting: EMERGENCY MEDICINE
Payer: COMMERCIAL

## 2025-02-12 VITALS
HEIGHT: 71 IN | HEART RATE: 52 BPM | TEMPERATURE: 97.3 F | DIASTOLIC BLOOD PRESSURE: 89 MMHG | SYSTOLIC BLOOD PRESSURE: 123 MMHG | WEIGHT: 175 LBS | RESPIRATION RATE: 18 BRPM | BODY MASS INDEX: 24.5 KG/M2 | OXYGEN SATURATION: 98 %

## 2025-02-12 DIAGNOSIS — R10.9 ACUTE LEFT FLANK PAIN: ICD-10-CM

## 2025-02-12 DIAGNOSIS — N20.1 URETEROLITHIASIS: ICD-10-CM

## 2025-02-12 LAB
ALBUMIN SERPL BCG-MCNC: 4.1 G/DL (ref 3.5–5.2)
ALBUMIN UR-MCNC: NEGATIVE MG/DL
ALP SERPL-CCNC: 73 U/L (ref 40–150)
ALT SERPL W P-5'-P-CCNC: 25 U/L (ref 0–70)
ANION GAP SERPL CALCULATED.3IONS-SCNC: 11 MMOL/L (ref 7–15)
APPEARANCE UR: CLEAR
AST SERPL W P-5'-P-CCNC: 37 U/L (ref 0–45)
BASOPHILS # BLD AUTO: 0 10E3/UL (ref 0–0.2)
BASOPHILS NFR BLD AUTO: 0 %
BILIRUB SERPL-MCNC: 0.7 MG/DL
BILIRUB UR QL STRIP: NEGATIVE
BUN SERPL-MCNC: 21.2 MG/DL (ref 6–20)
CALCIUM SERPL-MCNC: 9.3 MG/DL (ref 8.8–10.4)
CHLORIDE SERPL-SCNC: 103 MMOL/L (ref 98–107)
COLOR UR AUTO: ABNORMAL
CREAT SERPL-MCNC: 1.64 MG/DL (ref 0.67–1.17)
EGFRCR SERPLBLD CKD-EPI 2021: 49 ML/MIN/1.73M2
EOSINOPHIL # BLD AUTO: 0.1 10E3/UL (ref 0–0.7)
EOSINOPHIL NFR BLD AUTO: 1 %
ERYTHROCYTE [DISTWIDTH] IN BLOOD BY AUTOMATED COUNT: 12.1 % (ref 10–15)
GLUCOSE SERPL-MCNC: 104 MG/DL (ref 70–99)
GLUCOSE UR STRIP-MCNC: NEGATIVE MG/DL
HCO3 SERPL-SCNC: 24 MMOL/L (ref 22–29)
HCT VFR BLD AUTO: 48.1 % (ref 40–53)
HGB BLD-MCNC: 16 G/DL (ref 13.3–17.7)
HGB UR QL STRIP: ABNORMAL
IMM GRANULOCYTES # BLD: 0 10E3/UL
IMM GRANULOCYTES NFR BLD: 0 %
KETONES UR STRIP-MCNC: NEGATIVE MG/DL
LEUKOCYTE ESTERASE UR QL STRIP: NEGATIVE
LIPASE SERPL-CCNC: 63 U/L (ref 13–60)
LYMPHOCYTES # BLD AUTO: 1 10E3/UL (ref 0.8–5.3)
LYMPHOCYTES NFR BLD AUTO: 10 %
MCH RBC QN AUTO: 31.8 PG (ref 26.5–33)
MCHC RBC AUTO-ENTMCNC: 33.3 G/DL (ref 31.5–36.5)
MCV RBC AUTO: 96 FL (ref 78–100)
MONOCYTES # BLD AUTO: 0.7 10E3/UL (ref 0–1.3)
MONOCYTES NFR BLD AUTO: 8 %
NEUTROPHILS # BLD AUTO: 7.4 10E3/UL (ref 1.6–8.3)
NEUTROPHILS NFR BLD AUTO: 80 %
NITRATE UR QL: NEGATIVE
NRBC # BLD AUTO: 0 10E3/UL
NRBC BLD AUTO-RTO: 0 /100
PH UR STRIP: 6 [PH] (ref 5–7)
PLATELET # BLD AUTO: 261 10E3/UL (ref 150–450)
POTASSIUM SERPL-SCNC: 5.2 MMOL/L (ref 3.4–5.3)
PROT SERPL-MCNC: 6.6 G/DL (ref 6.4–8.3)
RBC # BLD AUTO: 5.03 10E6/UL (ref 4.4–5.9)
RBC URINE: 18 /HPF
SODIUM SERPL-SCNC: 138 MMOL/L (ref 135–145)
SP GR UR STRIP: 1.01 (ref 1–1.03)
UROBILINOGEN UR STRIP-MCNC: NORMAL MG/DL
WBC # BLD AUTO: 9.3 10E3/UL (ref 4–11)
WBC URINE: 0 /HPF

## 2025-02-12 PROCEDURE — 96374 THER/PROPH/DIAG INJ IV PUSH: CPT | Performed by: EMERGENCY MEDICINE

## 2025-02-12 PROCEDURE — 36415 COLL VENOUS BLD VENIPUNCTURE: CPT | Performed by: EMERGENCY MEDICINE

## 2025-02-12 PROCEDURE — 250N000011 HC RX IP 250 OP 636: Mod: JZ | Performed by: EMERGENCY MEDICINE

## 2025-02-12 PROCEDURE — 96375 TX/PRO/DX INJ NEW DRUG ADDON: CPT | Performed by: EMERGENCY MEDICINE

## 2025-02-12 PROCEDURE — 87086 URINE CULTURE/COLONY COUNT: CPT | Performed by: EMERGENCY MEDICINE

## 2025-02-12 PROCEDURE — 83690 ASSAY OF LIPASE: CPT | Performed by: EMERGENCY MEDICINE

## 2025-02-12 PROCEDURE — 85025 COMPLETE CBC W/AUTO DIFF WBC: CPT | Performed by: EMERGENCY MEDICINE

## 2025-02-12 PROCEDURE — 82435 ASSAY OF BLOOD CHLORIDE: CPT | Performed by: EMERGENCY MEDICINE

## 2025-02-12 PROCEDURE — 99285 EMERGENCY DEPT VISIT HI MDM: CPT | Mod: 25 | Performed by: EMERGENCY MEDICINE

## 2025-02-12 PROCEDURE — 80053 COMPREHEN METABOLIC PANEL: CPT | Performed by: EMERGENCY MEDICINE

## 2025-02-12 PROCEDURE — 99284 EMERGENCY DEPT VISIT MOD MDM: CPT | Performed by: EMERGENCY MEDICINE

## 2025-02-12 PROCEDURE — 81001 URINALYSIS AUTO W/SCOPE: CPT | Performed by: EMERGENCY MEDICINE

## 2025-02-12 RX ORDER — KETOROLAC TROMETHAMINE 15 MG/ML
15 INJECTION, SOLUTION INTRAMUSCULAR; INTRAVENOUS ONCE
Status: COMPLETED | OUTPATIENT
Start: 2025-02-12 | End: 2025-02-12

## 2025-02-12 RX ORDER — HYDROCODONE BITARTRATE AND ACETAMINOPHEN 5; 325 MG/1; MG/1
1 TABLET ORAL EVERY 6 HOURS PRN
Qty: 10 TABLET | Refills: 0 | Status: SHIPPED | OUTPATIENT
Start: 2025-02-12

## 2025-02-12 RX ORDER — NAPROXEN 500 MG/1
500 TABLET ORAL 2 TIMES DAILY PRN
Qty: 30 TABLET | Refills: 0 | Status: SHIPPED | OUTPATIENT
Start: 2025-02-12

## 2025-02-12 RX ORDER — HYDROMORPHONE HYDROCHLORIDE 1 MG/ML
0.5 INJECTION, SOLUTION INTRAMUSCULAR; INTRAVENOUS; SUBCUTANEOUS ONCE
Status: COMPLETED | OUTPATIENT
Start: 2025-02-12 | End: 2025-02-12

## 2025-02-12 RX ORDER — TAMSULOSIN HYDROCHLORIDE 0.4 MG/1
0.4 CAPSULE ORAL DAILY
Qty: 14 CAPSULE | Refills: 0 | Status: SHIPPED | OUTPATIENT
Start: 2025-02-12

## 2025-02-12 RX ADMIN — HYDROMORPHONE HYDROCHLORIDE 0.5 MG: 1 INJECTION, SOLUTION INTRAMUSCULAR; INTRAVENOUS; SUBCUTANEOUS at 11:02

## 2025-02-12 RX ADMIN — KETOROLAC TROMETHAMINE 15 MG: 15 INJECTION, SOLUTION INTRAMUSCULAR; INTRAVENOUS at 11:02

## 2025-02-12 ASSESSMENT — ACTIVITIES OF DAILY LIVING (ADL)
ADLS_ACUITY_SCORE: 41

## 2025-02-12 ASSESSMENT — COLUMBIA-SUICIDE SEVERITY RATING SCALE - C-SSRS
6. HAVE YOU EVER DONE ANYTHING, STARTED TO DO ANYTHING, OR PREPARED TO DO ANYTHING TO END YOUR LIFE?: NO
2. HAVE YOU ACTUALLY HAD ANY THOUGHTS OF KILLING YOURSELF IN THE PAST MONTH?: NO
1. IN THE PAST MONTH, HAVE YOU WISHED YOU WERE DEAD OR WISHED YOU COULD GO TO SLEEP AND NOT WAKE UP?: NO

## 2025-02-12 NOTE — TELEPHONE ENCOUNTER
Pt complains of left flank pain and abdominal pain which started at midnight. Constant pain rated 5-6/10. Pt also reports dysuria.  Pt seen last week at [place] and suspects kidney stone/s. He took Advil 600 mg at 1:30 am.     No vomiting.  No fever    PLAN: Be seen within 4 hours, okay to wait until [place] opens at 8 am or may go to ED if he prefers.    Dania Nickerson RN/[facility] Nurse Advisor    Reason for Disposition   Pain or burning with passing urine (urination)    Additional Information   Negative: Passed out (i.e., lost consciousness, collapsed and was not responding)   Negative: Shock suspected (e.g., cold/pale/clammy skin, too weak to stand, low BP, rapid pulse)   Negative: Difficult to awaken or acting confused (e.g., disoriented, slurred speech)   Negative: Sounds like a life-threatening emergency to the triager   Negative: Followed a major injury to the back (e.g., MVA, fall > 10 feet or 3 meters, penetrating injury, etc.)   Negative: Back pain or flank pain during pregnancy   Negative: Upper, mid or lower back pain that occurs mainly in the midline   Negative: [1] SEVERE pain (e.g., excruciating, scale 8-10) AND [2] present > 1 hour   Negative: [1] SEVERE pain (e.g., excruciating, scale 8-10) AND [2] not improved after pain medicine   Negative: [1] Sudden onset of severe flank pain AND [2] age > 60 years   Negative: [1] Abdominal pain AND [2] age > 60 years   Negative: [1] Unable to urinate (or only a few drops) > 4 hours AND [2] bladder feels very full (e.g., palpable bladder or strong urge to urinate)   Negative: Vomiting   Negative: Weakness of a leg or foot (e.g., unable to bear weight, dragging foot)   Negative: Patient sounds very sick or weak to the triager   Negative: Fever > 100.4 F (38.0 C)    Protocols used: Flank Pain-A-AH

## 2025-02-12 NOTE — DISCHARGE INSTRUCTIONS
Instructions from your doctor today:  Emergency Department (ED) testing is focused on the potential causes of your symptoms that are the most dangerous possibilities, and cannot cover every possibility. Based on the evaluation, it was deemed sufficiently safe to discharge and continue management through the clinics. Thus, follow-up is very important to assess for improvement/worsening, potential further testing, and potential treatment adjustments. If you were given opioid pain medications or other medications that can make you drowsy while in the ED, you should not drive for at least several hours and not until you feel completely back to normal.     Please make an appointment to follow up with:  - Urology Clinic (phone: [phone]) in about 7 days. A referral has been placed and they should call you to schedule. If you do not get a call by tomorrow afternoon then call the above number.   - If you do not have a primary care provider, you can be seen in follow-up and establish care by calling any of the clinics below:     - [facility] (phone: [phone])     - [facility] / [facility] (phone: [phone])   - Have your clinic provider review the results from today's visit with you again, including any potential follow-up or additional testing that may be needed based on the results. Occasionally, incidental findings are found on later review by radiologists that may need follow-up.     Return to the Emergency Department immediately if you have fever, significantly worsening symptoms, or any other urgent health concerns.

## 2025-02-12 NOTE — ED PROVIDER NOTES
"  History     Chief Complaint   Patient presents with    Flank Pain     Pt with c/o left flank pain with radiating pain to the left abdomen. Pt reports that it started last night. Pt describes the pain as \"burning\".      HPI  Tyree Blackwell is a 54 year old male with PMH notable for kidney stones, hypothyroidism  who presents to the ED with left flank pain. He states that this pain onset suddenly today around midnight. The pain radiates from his left flank around to the front left side of his abdomen. He also notes having a burning sensation in the front half of his penis that is present regardless if he is urinating. No vomiting or nausea. He has used Advil and Tylenol with mild success in managing this pain. He also states that he had a similar set of symptoms to this around 10 days ago, but that the symptoms were more mild in nature, and that it resolved within a few hours. He states that he was seen for this and had a UA done which did not show infection but did show scant amounts of blood in his urine. He denies having noticed any blood in his urine during this current or previous episode. He also notes having had a kidney stone about 9 years ago in which he experienced similar symptoms.      Physical Exam   BP: (!) 174/90  Pulse: 53  Temp: 97.3  F (36.3  C)  Resp: 17  Height: 180.3 cm (5' 11\")  Weight: 79.4 kg (175 lb)  SpO2: 97 %    Physical Exam  General: somewhat uncomfortable appearing, standing. Appears stated age.   HENT: MMM, no oropharyngeal lesions  Eyes: PERRL, normal sclerae   Cardio: Mildly bradycardic rate. Regular rhythm. Extremities well perfused  Resp: Normal work of breathing, Normal respiratory rate.   Abdomen: no tenderness, non-distended, no rebound, no guarding. No CVA tenderness.   Neuro: alert without signs of confusion. CN II-XII grossly intact. Grossly normal strength and sensation in all extremities.   Psych: normal affect, normal behavior      ED Course     ED Course as of 02/12/25 1248 "   Wed Feb 12, 2025   1020 Abd/pelvis CT no contrast - Stone Protocol     Procedures              Labs Ordered and Resulted from Time of ED Arrival to Time of ED Departure   COMPREHENSIVE METABOLIC PANEL - Abnormal       Result Value    Sodium 138      Potassium 5.2      Carbon Dioxide (CO2) 24      Anion Gap 11      Urea Nitrogen 21.2 (*)     Creatinine 1.64 (*)     GFR Estimate 49 (*)     Calcium 9.3      Chloride 103      Glucose 104 (*)     Alkaline Phosphatase 73      AST 37      ALT 25      Protein Total 6.6      Albumin 4.1      Bilirubin Total 0.7     LIPASE - Abnormal    Lipase 63 (*)    ROUTINE UA WITH MICROSCOPIC - Abnormal    Color Urine Straw      Appearance Urine Clear      Glucose Urine Negative      Bilirubin Urine Negative      Ketones Urine Negative      Specific Gravity Urine 1.011      Blood Urine Moderate (*)     pH Urine 6.0      Protein Albumin Urine Negative      Urobilinogen Urine Normal      Nitrite Urine Negative      Leukocyte Esterase Urine Negative      RBC Urine 18 (*)     WBC Urine 0     CBC WITH PLATELETS AND DIFFERENTIAL    WBC Count 9.3      RBC Count 5.03      Hemoglobin 16.0      Hematocrit 48.1      MCV 96      MCH 31.8      MCHC 33.3      RDW 12.1      Platelet Count 261      % Neutrophils 80      % Lymphocytes 10      % Monocytes 8      % Eosinophils 1      % Basophils 0      % Immature Granulocytes 0      NRBCs per 100 WBC 0      Absolute Neutrophils 7.4      Absolute Lymphocytes 1.0      Absolute Monocytes 0.7      Absolute Eosinophils 0.1      Absolute Basophils 0.0      Absolute Immature Granulocytes 0.0      Absolute NRBCs 0.0     URINE CULTURE     Abd/pelvis CT no contrast - Stone Protocol   Final Result   IMPRESSION:    1. 4 x 3 x 2 mm obstructing stone at the left ureterovesical junction   with mild upstream hydroureteronephrosis and moderate edematous   perinephric fat stranding.   2. Cortical and trabecular thickening and sclerosis of the right iliac   bone, which can  be seen with Paget's disease.       RADHA DÍAZ DO            SYSTEM ID:  [id number]           Medical Decision Making  The patient's presentation was of high complexity (a chronic illness severe exacerbation, progression, or side effect of treatment).    The patient's evaluation involved:  ordering and/or review of 3+ test(s) in this encounter (see separate area of note for details)  independent interpretation of testing performed by another health professional (CT)    The patient's management necessitated high risk (a parenteral controlled substance).      Assessments & Plan   Patient presenting with acute left flank pain with pertinent PMH of past kidney stones. Vitals in the ED with mild bradycardia and mildly elevated BP, otherwise unremarkable. Nursing notes reviewed.     No localizing RUQ pain nor LFT elevations to suggest hepatobiliary pathology. No significant lipase elevation to suggest pancreatitis. No peritoneal signs on exam to suggest peritonitis.  Creatinine 1.64, mildly elevated from 1.36 in 2015.  CT abdomen/pelvis demonstrated 4 mm stone at the left UVJ with mild upstream hydronephrosis and perinephric fat stranding.  UA was without evidence of UTI/pyelonephritis.  Urine culture sent.    In the ED, the patient's symptoms were managed with ketorolac and hydromorphone, with improvement in symptoms upon reassessment.     The complete clinical picture is most consistent with left ureterolithiasis. After counseling on the diagnosis, work-up, and treatment plan, the patient was discharged to home.  Patient provided with strainer and sample cup, prescriptions as below, counseled to drink plenty of fluids.  The patient was advised to follow-up with urology in about a week, referral placed. The patient was advised to return to the ED if worsening symptoms, fever, or any urgent health concerns.     Final diagnoses:   Ureterolithiasis   Acute left flank pain     New Prescriptions     HYDROCODONE-ACETAMINOPHEN (NORCO) 5-325 MG TABLET    Take 1 tablet by mouth every 6 hours as needed for severe pain.    NAPROXEN (NAPROSYN) 500 MG TABLET    Take 1 tablet (500 mg) by mouth 2 times daily as needed for moderate pain. Take with meals.    TAMSULOSIN (FLOMAX) 0.4 MG CAPSULE    Take 1 capsule (0.4 mg) by mouth daily.     I, Tyrel Mendez, am serving as a trained medical scribe to document services personally performed by Con Bhardwaj MD, based on the provider's statements to me.     I, Con Bhardwaj MD, was physically present and have reviewed and verified the accuracy of this note documented by Tyrel Mendez.        --  Con Bhardwaj MD   Emergency Medicine   [facility] EMERGENCY DEPARTMENT  2/12/2025       Con Bhardwaj MD  02/12/25 5063

## 2025-02-12 NOTE — ED TRIAGE NOTES
"Pt with c/o left flank pain with radiating pain to the left abdomen. Pt reports that it started last night. Pt describes the pain as \"burning\".      Triage Assessment (Adult)       Row Name 02/12/25 0922          Triage Assessment    Airway WDL WDL        Respiratory WDL    Respiratory WDL WDL        Skin Circulation/Temperature WDL    Skin Circulation/Temperature WDL WDL        Cardiac WDL    Cardiac WDL WDL        Peripheral/Neurovascular WDL    Peripheral Neurovascular WDL WDL        Cognitive/Neuro/Behavioral WDL    Cognitive/Neuro/Behavioral WDL WDL                     "

## 2025-02-13 LAB — BACTERIA UR CULT: NO GROWTH

## 2025-04-08 ENCOUNTER — ANCILLARY PROCEDURE (OUTPATIENT)
Dept: CT IMAGING | Facility: CLINIC | Age: 55
End: 2025-04-08
Attending: NURSE PRACTITIONER
Payer: COMMERCIAL

## 2025-04-08 DIAGNOSIS — N20.1 LEFT URETERAL CALCULUS: ICD-10-CM

## 2025-04-08 PROCEDURE — 74176 CT ABD & PELVIS W/O CONTRAST: CPT | Performed by: STUDENT IN AN ORGANIZED HEALTH CARE EDUCATION/TRAINING PROGRAM

## 2025-04-09 ENCOUNTER — VIRTUAL VISIT (OUTPATIENT)
Dept: UROLOGY | Facility: CLINIC | Age: 55
End: 2025-04-09
Payer: COMMERCIAL

## 2025-04-09 DIAGNOSIS — N20.0 NEPHROLITHIASIS: ICD-10-CM

## 2025-04-09 DIAGNOSIS — N20.1 LEFT URETERAL CALCULUS: Primary | ICD-10-CM

## 2025-04-09 PROCEDURE — 98005 SYNCH AUDIO-VIDEO EST LOW 20: CPT | Performed by: NURSE PRACTITIONER

## 2025-04-09 PROCEDURE — 1125F AMNT PAIN NOTED PAIN PRSNT: CPT | Mod: 95 | Performed by: NURSE PRACTITIONER

## 2025-04-09 NOTE — PATIENT INSTRUCTIONS
UROLOGY CLINIC VISIT PATIENT INSTRUCTIONS    It was a pleasure seeing you today! Thank you for giving us the opportunity to care for you. We hope we provided the excellent service you deserve and look forward to serving you again.    If you have any issues, questions, or concerns, please don't hesitate to contact us at [facility] at [phone] or via [email or web address].    Important Contact Information:  -To each our nurse triage line, please call our contact center at [phone].  -Our clinic hours are Monday through Friday, 8:00 a.m. - 4:30 p.m. Feel free to call during these hours with any questions.  -You can also contact us anytime via [email or web address], and we will respond during clinic hours.      Honey Garcia CNP  Department of Urology      DIET & LIFESTYLE CHANGES FOR PATIENTS WITH KIDNEY STONES    If you've had a kidney stone, you are likely to form another. Risk of recurrence is 15% at 1 year, 35% to 40% at 2 years, and 50% at 10 years. Therefore, prevention is very important.   These recommendations have shown to be effective.    CALCIUM STONES (Oxalate and Phosphate)    Fluid intake is the most important prevention measure to help prevent stones. Fluid intake should be at least 2.5 liters per day or 90-120oz per day. With goal of urine output of >2.5L per day.   Increasing liquids that have citric acid may help such as low calorie orange juice, lemonade (Crystal Light Lemonade or True Lemon/Lime), or adding a citrus to your water.  You can add lemon juice or fresh betsey to your water daily.  Lemon juice increases the citrate in your urine, and helps decrease the formation of stone and even breakdown certain types of stones. Add a cap full/teaspoon of pure lemon juice to each glass.   Try to limit sugar, especially if you have diabetes.    Helpful Fluid Measurements:  1 liter = 34oz  1 quart = 32 oz  24 pack water: Each bottle 16.9 oz     Low Oxalate Diet: Limit your consumption of OXALATE-rich  "foods including:  Some nuts including peanuts, almonds,peanut butter, almond milk.   Okay to eat with stones: walnuts, pecans, pistachios, and seeds (pumpkin or sunflower).   Spinach  Rhubarb  Beets  Chocolate-Dark chocolate or cocoa  Soybeans and soy products   Baked potatoes with skin    Website:   www."[employer].Co-Work    Below is a link to a PDF that is based on [employer] research. Please stick to pages 6-9 of this document. My suggestion is to review the list of food that is OK. The \"avoid\" list can be overwhelming.  https://[employer]"/[email or web address]/files/10h15hne-22gw-[email or web address]/Oxalate_Food_Lists_KSD.pdf?mc_cid=[email or web address]&mc_eid=80qt29192a    Low Sodium Diet: Salt (sodium chloride) is found in abundance in many foods. High sodium levels in the urine can interfere with the kidney's handling of calcium.   Trying a DASH (Dietary Approaches to Stop Hypertension) diet which is eating more fruits and vegetables, limiting salt intake, moderate in low-fat diary products, and low in animal protein.   Try to decrease salt intake to <6857-8650 mg of sodium daily.     Tips for reducing the salt in your diet:  Don't use salt at the table  Reduce the salt used in food preparation. Try 1/2 teaspoon when recipes call for 1 teaspoon.  Use herbs and spices for flavoring instead of salt.  Avoid salty foods.  Check the label before you buy or use a product. Note sodium and portion size information.  Try to consume less than 2,000 mg/day. (1 teaspoon = 2,000 mg)    Foods with high sodium content include:  Processed meat (including luncheon meats, sausage)   Crackers   Instant cereal   Processed cheese   Canned soups   Chips and snack foods   Soy sauce    Low Animal Protein: Reduce animal protein (meat) intake to no more than 8-10 ounces per day. Increase fruit and vegetables to 5 servings per day.    Maintain a normal calcium diet: Calcium rich foods are encouraged, but no more than 1000 - 1200 " mg per day. Researches have found that people with low calcium intakes tend to have more stones. Foods with high calcium content are acceptable and include:  Calcium rich foods include:   Diary (cheese, milk, and yogurt)  Enriched cereals  Meat and fish  Dark green vegetables  Non-Dairy Calcium Sources:  Fortified Coconut, Rice, Flax, Oat milk  (avoid almond milk)  Calcium fortified Orange Juice  - look for a low sugar/light variety  Canned sardines, canned pink salmon with bones  - look for low sodium options  Fortified cereals  Oatmeal  Broccoli, peas, chinese cabbage/bok pavel, Kale, mustard greens, pistachio nuts, mung beans, red kidney beans     Limit Vitamin C intake to < 1000 mg daily.

## 2025-04-09 NOTE — PROGRESS NOTES
Urology Video Office Visit    Video-Visit Details    Type of service:  Video Visit    Video Start Time: 1026    Video End Time: 1043    Originating Location (pt. Location): Home    Distant Location (provider location):  Off-site     Platform used for Video Visit: MIKA Audio           Assessment and Plan:     Assessment: 54 year old male with a left ureteral stone.    Plan:  -Reviewed CT scan with patient.  Discussed that likely either stone is  at UVJ or perhaps has already passed into urinary bladder.  Likely due to absence of symptoms that stone has passed at this time.  Discussed option of follow-up renal/bladder US in 4 weeks to ensure stone passage.  If symptoms are ongoing will obtain renal/bladder US.  -The patient and I discussed the diagnosis and natural history of urolithiasis. We discussed some general measures to prevent recurrent kidney stones.  These include fluid intake to achieve 2.5 liters of urine per day, decreased salt intake, normal calcium intake, lowering animal protein intake, avoiding high amounts of oxalate containing foods, and increased citrate intake with orange juice/lemonade.  -If having severe flank pain, fevers, chills, nausea, or vomiting please notify Urology clinic or be seen in the ER.   -RTC in 4 weeks with renal US.    Honey Garcia CNP  Department of Urology  2025    I spent a total of 20 minutes spent on the date of the encounter doing chart review, history and exam, documentation, and further activities as noted above.          Chief Complaint:   Left Ureteral Stone         History of Present Illness:    Tyree Blackwell is a pleasant 54 year old male who presents with concerns of a left ureteral stone.     Mr. Blackwell was last seen with urology on 3/7/2025.  He was noted to have a left 4 mm UVJ stone.  He opted for MET x 4 weeks at that time.    CT scan on 2025 (images personally reviewed) revealed left 4 mm stone either at UVJ or in urinary bladder.    He  notes no ongoing flank pain since his CT scan. Denies f/c/n/v, gross hematuria, or dysuria.  He will noticed some burning in his urethra while laying in bed.    Last stone episode was about 9 years ago which he was able to pass on own.      No known family history of nephrolithiasis.      Stone Risk Factors: None      Previous daily vitamin C use.     He does commute back and forth to work with biking.  Does note fluid loss through sweating and activity.  Will drink about 80 ounces of fluids per day.  He does avoid milk.  But will eat cheese and Greek yogurt.         Past Medical History:     Past Medical History:   Diagnosis Date    Psoriasis     Thyroid disease             Past Surgical History:     Past Surgical History:   Procedure Laterality Date    HERNIORRHAPHY UMBILICAL  4/27/2012    Procedure:HERNIORRHAPHY UMBILICAL; Open Umbilical Hernia Repair; Surgeon:JUNI CURRIE; Location:[facility] OR    tonsillectomy[      wisdom teeth extraction[              Medications     Current Outpatient Medications   Medication Sig Dispense Refill    levothyroxine (SYNTHROID/LEVOTHROID) 75 MCG tablet Take 75 mcg by mouth daily      loratadine 10 MG capsule Take 10 mg by mouth daily.      tamsulosin (FLOMAX) 0.4 MG capsule Take 1 capsule (0.4 mg) by mouth daily. 30 capsule 0     No current facility-administered medications for this visit.            Family History:     Family History   Problem Relation Age of Onset    Liver Disease Father         CARMICHAEL s/p liver transplant 2012    Coronary Artery Disease Maternal Grandfather     Hypertension Maternal Grandfather     Cerebrovascular Disease Maternal Grandfather 40    Thyroid Disease Paternal Grandmother     Coronary Artery Disease Paternal Grandmother 75        s/p CABG    Prostate Cancer Paternal Grandfather             Social History:     Social History     Socioeconomic History    Marital status:      Spouse name: Not on file    Number of children: Not on file    Years  of education: Not on file    Highest education level: Not on file   Occupational History    Not on file   Tobacco Use    Smoking status: Never    Smokeless tobacco: Never   Substance and Sexual Activity    Alcohol use: No    Drug use: No    Sexual activity: Not on file   Other Topics Concern    Not on file   Social History Narrative    Not on file     Social Drivers of Health     Financial Resource Strain: Not on file   Food Insecurity: Not on file   Transportation Needs: Not on file   Physical Activity: Not on file   Stress: Not on file   Social Connections: Not on file   Interpersonal Safety: Not on file   Housing Stability: Not on file            Allergies:   Patient has no known allergies.         Review of Systems:  From intake questionnaire   Negative 14 system review except as noted on HPI, nurse's note.         Physical Exam:   General Appearance: Well groomed, hygenic  Eyes: No redness, discharge  Respiratory: No cough, no respiratory distress or labored breathing  Musculoskeletal: Grossly normal, full range of motion in upper extremities, no gross deficits  Skin: No discoloration or apparent rashes  Neurologic - No tremors  Psychiatric - Alert and oriented  The rest of a comprehensive physical examination is deferred due to video visit restrictions        Labs:    I personally reviewed all applicable laboratory data and went over findings with patient  Significant for:    CBC RESULTS:  Recent Labs   Lab Test 02/12/25  1020   WBC 9.3   HGB 16.0           BMP RESULTS:  Recent Labs   Lab Test 02/12/25  1150 10/27/22  1028     --    POTASSIUM 5.2  --    CHLORIDE 103  --    CO2 24  --    ANIONGAP 11  --    * 105*   BUN 21.2*  --    CR 1.64*  --    GFRESTIMATED 49*  --    JOS 9.3  --        UA RESULTS:   Recent Labs   Lab Test 02/12/25  1008   SG 1.011   URINEPH 6.0   NITRITE Negative   RBCU 18*   WBCU 0       CALCIUM RESULTS  Lab Results   Component Value Date    JOS 9.3 02/12/2025    JOS  8.8 12/23/2015    JOS 8.6 11/07/2012           Imaging:    I personally reviewed all applicable imaging and went over the below findings with patient.    Results for orders placed or performed in visit on 04/08/25   CT Abdomen Pelvis w/o Contrast    Narrative    EXAMINATION: CT ABDOMEN PELVIS W/O CONTRAST, 4/8/2025 8:30 AM    INDICATION: left ureteral stone; Left ureteral calculus    COMPARISON STUDY: CT 2/12/2025    TECHNIQUE: CT scan of the abdomen and pelvis was performed on  multidetector CT scanner using volumetric acquisition technique and  images were reconstructed in multiple planes with variable thickness  and reviewed on dedicated workstations.     CONTRAST: None     CT scan radiation dose is optimized to minimum requisite dose using  automated dose modulation techniques.    FINDINGS:    Lower thorax: Normal.    Liver: No mass. No intrahepatic biliary ductal dilation.    Biliary System: Normal gallbladder. No extrahepatic biliary ductal  dilation.    Spleen: Normal.    Pancreas: No mass or pancreatic ductal dilation.    Adrenal glands: No mass or nodules    Kidneys: No suspicious mass, obstructing calculus or hydronephrosis.   The previously seen left hydronephrosis and perinephric stranding has  resolved. There is a 3 mm calculus at the left ureterovesical  junction, possibly within the bladder lumen, previously at the distal  ureter.      Gastrointestinal tract :Appendix is not identified, however no  inflammatory change in the right lower quadrant to suggest  appendicitis. Normal caliber small bowel.    Retroperitoneum: No aneurysmal dilatation. No significant  lymphadenopathy.    Pelvis: Urinary bladder is unremarkable.  Prostate and seminal  vesicles are within normal limits.    Osseous structures: No aggressive or acute osseous lesion.  Unchanged  sclerosis of the right iliac bone and ankylosis of bilateral  sacroiliac joints.      Soft tissues: Within normal limits.      Impression     IMPRESSION:  1.  Calculus at the left ureterovesical junction, possibly within the  bladder lumen, previously in the distal ureter. Previously seen left  hydronephrosis and perinephric stranding has resolved.  2.  Unchanged sclerosis and cortical thickening of the right iliac  bone as can be seen with Paget's disease.    CARMEN PURI DO         SYSTEM ID:  [id number]

## 2025-04-09 NOTE — NURSING NOTE
Current patient location: [place]    Is the patient currently in the state of [place]? YES    Visit mode: VIDEO    If the visit is dropped, the patient can be reconnected by:VIDEO VISIT: Text to cell phone:   Telephone Information:   Mobile [phone]       Will anyone else be joining the visit? NO  (If patient encounters technical issues they should call [phone])    Are changes needed to the allergy or medication list? E-check in was reviewed/completed for today's visit. ANNY did not review e-check in information again with Tyree due to this.       Are refills needed on medications prescribed by this physician? Discuss with provider    Rooming Documentation:  Not applicable    Reason for visit: RECHECK    Nakita WHITE

## 2025-04-14 ENCOUNTER — TELEPHONE (OUTPATIENT)
Dept: UROLOGY | Facility: CLINIC | Age: 55
End: 2025-04-14
Payer: COMMERCIAL

## 2025-04-14 NOTE — TELEPHONE ENCOUNTER
Ok for central sched to set up    Pt needs a ct prior to a virtual visit with Honey Garcia in 3 weeks

## 2025-05-07 ENCOUNTER — ANCILLARY PROCEDURE (OUTPATIENT)
Dept: ULTRASOUND IMAGING | Facility: CLINIC | Age: 55
End: 2025-05-07
Attending: NURSE PRACTITIONER
Payer: COMMERCIAL

## 2025-05-07 ENCOUNTER — VIRTUAL VISIT (OUTPATIENT)
Dept: UROLOGY | Facility: CLINIC | Age: 55
End: 2025-05-07
Payer: COMMERCIAL

## 2025-05-07 DIAGNOSIS — N20.1 LEFT URETERAL CALCULUS: ICD-10-CM

## 2025-05-07 DIAGNOSIS — N20.0 NEPHROLITHIASIS: ICD-10-CM

## 2025-05-07 DIAGNOSIS — N20.0 NEPHROLITHIASIS: Primary | ICD-10-CM

## 2025-05-07 PROCEDURE — 76770 US EXAM ABDO BACK WALL COMP: CPT | Mod: GC | Performed by: STUDENT IN AN ORGANIZED HEALTH CARE EDUCATION/TRAINING PROGRAM

## 2025-05-07 ASSESSMENT — PAIN SCALES - GENERAL: PAINLEVEL_OUTOF10: NO PAIN (0)

## 2025-05-07 NOTE — PROGRESS NOTES
Urology Video Office Visit    Video-Visit Details    Type of service:  Video Visit    Video Start Time: 1539    Video End Time:1546    Originating Location (pt. Location): Home    Distant Location (provider location):  Off-site     Platform used for Video Visit: [employer]           Assessment and Plan:     Assessment: 54 year old male with history of nephrolithiasis    Plan:  -Reviewed renal ultrasound with patient.  No noted bladder stone.  Discussed that likely stone has passed at this time.  Recommend to reach out to urology if any further signs or symptoms of kidney stones.  -We reviewed general measures to prevent recurrent kidney stones.  These include fluid intake to achieve 2.5 liters of urine per day, decreased salt intake, normal calcium intake, lowering animal protein intake, avoiding high amounts of oxalate containing foods, and increased citrate intake with orange juice/lemonade.  -Discussed option of follow-up 24-hour urine study for further evaluation for risk of stones.  PT would like to hold off at this time.  - RTC in 1 year with K[facility] or sooner as needed.      Honey Garcia CNP  Department of Urology  May 7, 2025    I spent a total of 15 minutes spent on the date of the encounter doing chart review, history and exam, documentation, and further activities as noted above.          Chief Complaint:   Left ureteral stone          History of Present Illness:    Tyree Blackwell is a pleasant 54 year old male who presents for follow-up of left ureteral stone.    Mr. Blackwell was last seen with urology on 4/9/2025 for follow-up of a left ureteral stone.  He has been doing well since that time.  Denies any signs or symptoms of a kidney stone.    Renal US on 5/7/2025 images personally reviewed) revealed bilateral symmetrical kidneys without hydronephrosis.  Bladder was unremarkable with no evidence of bladder stone.    Last stone episode was about 9 years ago which he was able to pass on own.      No known family  history of nephrolithiasis.      Stone Risk Factors: None      Previous daily vitamin C use.      He does commute back and forth to work with biking.  Does note fluid loss through sweating and activity.  Will drink about 80 ounces of fluids per day.  He does avoid milk.  But will eat cheese and Greek yogurt.            Past Medical History:     Past Medical History:   Diagnosis Date    Psoriasis     Thyroid disease             Past Surgical History:     Past Surgical History:   Procedure Laterality Date    HERNIORRHAPHY UMBILICAL  4/27/2012    Procedure:HERNIORRHAPHY UMBILICAL; Open Umbilical Hernia Repair; Surgeon:JUNI CURRIE; Location:[facility] OR    tonsillectomy[      wisdom teeth extraction[              Medications     Current Outpatient Medications   Medication Sig Dispense Refill    levothyroxine (SYNTHROID/LEVOTHROID) 75 MCG tablet Take 75 mcg by mouth daily      loratadine 10 MG capsule Take 10 mg by mouth daily.      tamsulosin (FLOMAX) 0.4 MG capsule Take 1 capsule (0.4 mg) by mouth daily. 30 capsule 0     No current facility-administered medications for this visit.            Family History:     Family History   Problem Relation Age of Onset    Liver Disease Father         CARMICHAEL s/p liver transplant 2012    Coronary Artery Disease Maternal Grandfather     Hypertension Maternal Grandfather     Cerebrovascular Disease Maternal Grandfather 40    Thyroid Disease Paternal Grandmother     Coronary Artery Disease Paternal Grandmother 75        s/p CABG    Prostate Cancer Paternal Grandfather             Social History:     Social History     Socioeconomic History    Marital status:      Spouse name: Not on file    Number of children: Not on file    Years of education: Not on file    Highest education level: Not on file   Occupational History    Not on file   Tobacco Use    Smoking status: Never    Smokeless tobacco: Never   Substance and Sexual Activity    Alcohol use: No    Drug use: No    Sexual  activity: Not on file   Other Topics Concern    Not on file   Social History Narrative    Not on file     Social Drivers of Health     Financial Resource Strain: Not on file   Food Insecurity: Not on file   Transportation Needs: Not on file   Physical Activity: Not on file   Stress: Not on file   Social Connections: Not on file   Interpersonal Safety: Not on file   Housing Stability: Not on file            Allergies:   Patient has no known allergies.         Review of Systems:  From intake questionnaire   Negative 14 system review except as noted on HPI, nurse's note.         Physical Exam:   General Appearance: Well groomed, hygenic  Eyes: No redness, discharge  Respiratory: No cough, no respiratory distress or labored breathing  Musculoskeletal: Grossly normal, full range of motion in upper extremities, no gross deficits  Skin: No discoloration or apparent rashes  Neurologic - No tremors  Psychiatric - Alert and oriented  The rest of a comprehensive physical examination is deferred due to video visit restrictions        Labs:    I personally reviewed all applicable laboratory data and went over findings with patient  Significant for:    CBC RESULTS:  Recent Labs   Lab Test 02/12/25  1020   WBC 9.3   HGB 16.0           BMP RESULTS:  Recent Labs   Lab Test 02/12/25  1150 10/27/22  1028     --    POTASSIUM 5.2  --    CHLORIDE 103  --    CO2 24  --    ANIONGAP 11  --    * 105*   BUN 21.2*  --    CR 1.64*  --    GFRESTIMATED 49*  --    JOS 9.3  --        UA RESULTS:   Recent Labs   Lab Test 02/12/25  1008   SG 1.011   URINEPH 6.0   NITRITE Negative   RBCU 18*   WBCU 0       CALCIUM RESULTS  Lab Results   Component Value Date    JOS 9.3 02/12/2025    JOS 8.8 12/23/2015    JOS 8.6 11/07/2012           Imaging:    I personally reviewed all applicable imaging and went over the below findings with patient.    Results for orders placed or performed in visit on 05/07/25    Renal Complete Non-Vascular     Narrative    EXAMINATION: US RENAL COMPLETE NON-VASCULAR, 5/7/2025 7:50 AM     COMPARISON: Renal ultrasound 3/1/2016 and CT 4/8/2025    HISTORY: Left ureteral calculus , assess for bladder stone    TECHNIQUE: The kidneys and bladder were scanned in the standard  fashion with specialized ultrasound transducer(s) using both gray  scale and limited color/spectral Doppler techniques.    FINDINGS:    Right kidney: Measures 11.1 cm in length. Parenchyma is of normal  thickness and echogenicity. No focal mass. No hydronephrosis.    Left kidney: Measures 10.7 cm in length. Parenchyma is of normal  thickness and echogenicity. No focal mass. No hydronephrosis.     Bladder: Partially distended with no evidence of stone.      Impression    IMPRESSION:  1.  No evidence of hydronephrosis or nephrolithiasis in this  examination.  2.  No definite bladder stone.    I have personally reviewed the examination and initial interpretation  and I agree with the findings.    CARMEN PURI DO         SYSTEM ID:  [id number]

## 2025-05-07 NOTE — NURSING NOTE
Current patient location: [address] [place] [id number]    Is the patient currently in the state of [place]? YES    Visit mode: VIDEO    If the visit is dropped, the patient can be reconnected by:VIDEO VISIT: Send to e-mail at: [email or web address]    Will anyone else be joining the visit? NO  (If patient encounters technical issues they should call [phone])    Are changes needed to the allergy or medication list? No    Are refills needed on medications prescribed by this physician? NO    Rooming Documentation:  Questionnaire(s) completed    Reason for visit: KATE GRUBBSF

## 2025-05-07 NOTE — PATIENT INSTRUCTIONS
UROLOGY CLINIC VISIT PATIENT INSTRUCTIONS    It was a pleasure seeing you today! Thank you for giving us the opportunity to care for you. We hope we provided the excellent service you deserve and look forward to serving you again.      If you have any issues, questions, or concerns, please don't hesitate to contact us at [facility] at [phone] or via [employer].    Important Contact Information:  -To each our nurse triage line, please call our contact center at [phone].  -Our clinic hours are Monday through Friday, 8:00 a.m. - 4:30 p.m. Feel free to call during these hours with any questions.  -You can also contact us anytime via [employer], and we will respond during clinic hours.      Honey Garcia CNP  Department of Urology